# Patient Record
(demographics unavailable — no encounter records)

---

## 2024-11-12 NOTE — ADDENDUM
[FreeTextEntry1] : I, JANELL VILLATORO, acted solely as a scribe for Dr. Asher Watt on this date 11/12/2024.  All medical record entries made by the Scribe were at my, Dr. Asher Watt, direction and personally dictated by me on 11/12/2024. I have reviewed the chart and agree that the record accurately reflects my personal performance of the history, physical exam, assessment and plan. I have also personally directed, reviewed, and agreed with the chart.

## 2024-11-12 NOTE — DISCUSSION/SUMMARY
[de-identified] : I went over the pathophysiology of the patient's symptoms in great detail with the patient. I discussed the underlying pathophysiology of the patient's condition in great detail with the patient. I went over the patient's x-rays with them in great detail. At this time, she will start a course of physical therapy for strengthening and flexibility. A prescription was provided. We are prescribing Diclofenac 50mg twice a day. Instructions were discussed with the patient. A prescription was provided.   All of their questions were answered. They understand and consent to the plan.   FU in one month.

## 2024-11-12 NOTE — PHYSICAL EXAM
[de-identified] : Constitutional o Appearance : well-nourished, well developed, alert, in no acute distress  Head and Face o Head :  Inspection : atraumatic, normocephalic o Face :  Inspection : no visible rash or discoloration Respiratory o Respiratory Effort: breathing unlabored  Neurologic o Sensation : Normal sensation  Psychiatric o Mood and Affect: mood normal, affect appropriate  Lymphatic o Additional Nodes : No palpable lymph nodes present   Cervical Spine o Inspection/Palpation :  Inspection : alignment midline, normal degree of lordosis present  Skin : normal appearance, no masses or tenderness, trachea midline  Palpation : right paracervical tenderness o Range of Motion : limited rotation left and right o Tests: Negative Spurlings test  Right Upper Extremity o Right Shoulder :  Inspection/Palpation : anterior capsular  tenderness and AC joint tenderness, no swelling or deformities  Range of Motion : forward flexion to 110, full ER and limited IR with pain at the extremes of motion, abduction to 80 degrees, no crepitance  Strength : forward elevation 4-/5, IR 5/5, ER 5/5, ER at 90 of abduction 4-/5, supraspinatus 4-/5, adduction 5/5, abduction 4-/5, biceps/triceps 5/5,  5/5  Stability : no joint instability on provocative testing   Tests: Mon negative, Neer negative, Vanessa negative, drop arm test negative, [place]'s test positive  Left Upper Extremity o Left Shoulder :  Inspection/Palpation : no tenderness, no swelling or deformities  Range of Motion : full and painless in all planes, no crepitance  Strength : forward elevation 5/5, IR 5/5, ER 5/5, ER at 90 of abduction 5/5, supraspinatus 5/5, adduction 5/5, abduction 5/5, biceps/triceps 5/5,  5/5  Stability : no joint instability on provocative testing   Tests: Mon negative, Neer negative, Vanessa negative, drop arm test negative, [place]'s test negative  Gait and Station: Gait: gait normal, no significant extremity swelling or lymphedema, good proprioception and balance  Radiology Results 11/12/2024 o Right Shoulder: AP internal/external rotation and outlet views were obtained and revealed sclerosis of the greater tuberosity with moderate degenerative arthritis of the glenohumeral joint and AC joint

## 2025-03-11 NOTE — HISTORY OF PRESENT ILLNESS
[de-identified] : 54 year old RHD male, presents for a follow-up evaluation of right shoulder pain x 1 year. Patient states that she has intermittent right shoulder pain, radiating down to her right hand, described as "sharp, shooting". Patient states that pain was likely exacerbated by her job as an [employer] , pulling >50lb loads, but denies any recent falls, trauma, or other injuries. Patient now endorses difficulty getting dressed due to worsening right shoulder pain with movement. Patient admits to taking Diclofenac and she adds Tylenol, which provides some pain relief. Patient is not participating in PT at this time. Patient denies any associated numbness, tingling, or weakness to RUE.  She has not gone to physical therapy as we spoke about in November.  Radiology Results 11/12/2024 o Right Shoulder: AP internal/external rotation and outlet views were obtained and revealed sclerosis of the greater tuberosity with moderate degenerative arthritis of the glenohumeral joint and AC joint

## 2025-03-11 NOTE — PHYSICAL EXAM
[de-identified] : Constitutional o Appearance : well-nourished, well developed, alert, in no acute distress  Head and Face o Head :  Inspection : atraumatic, normocephalic o Face :  Inspection : no visible rash or discoloration Respiratory o Respiratory Effort: breathing unlabored  Neurologic o Sensation : Normal sensation  Psychiatric o Mood and Affect: mood normal, affect appropriate  Lymphatic o Additional Nodes : No palpable lymph nodes present   Cervical Spine o Inspection/Palpation :  Inspection : alignment midline, normal degree of lordosis present  Skin : normal appearance, no masses or tenderness, trachea midline  Palpation : right paracervical tenderness o Range of Motion : limited rotation left and right o Tests: Negative Spurlings test  Right Upper Extremity o Right Shoulder :  Inspection/Palpation : anterior capsular  tenderness and AC joint tenderness, no swelling or deformities  Range of Motion : forward flexion to 90 abduction to 80 degrees, limited IR L2,  no crepitance  Strength : forward elevation 4-/5, IR 5/5, ER 5/5, ER at 90 of abduction 4-/5, supraspinatus 4-/5, adduction 5/5, abduction 4-/5, biceps/triceps 5/5,  5/5  Stability : no joint instability on provocative testing   Tests: Mon negative, Neer negative, Vanessa negative, drop arm test negative, [place]'s test positive  Left Upper Extremity o Left Shoulder :  Inspection/Palpation : no tenderness, no swelling or deformities  Range of Motion : full and painless in all planes, no crepitance  Strength : forward elevation 5/5, IR 5/5, ER 5/5, ER at 90 of abduction 5/5, supraspinatus 5/5, adduction 5/5, abduction 5/5, biceps/triceps 5/5,  5/5  Stability : no joint instability on provocative testing   Tests: Mon negative, Neer negative, Vanessa negative, drop arm test negative, [place]'s test negative  Gait and Station: Gait: gait normal, no significant extremity swelling or lymphedema, good proprioception and balance

## 2025-03-11 NOTE — DISCUSSION/SUMMARY
[de-identified] : I went over the pathophysiology of the patient's symptoms in great detail with the patient. The patient was instructed in ROM exercises they are to do at home. We discussed the use of ice, Tylenol and anti-inflammatories to relieve pain. At this time, she will start a course of physical therapy for strengthening and flexibility. A prescription was provided. We have prescribed Diclofenac. A prescription was provided.  We discussed the use of Salon Pas lidocaine patches gel at this time. Instructions were discussed with the patient.  We have told the patient if she does not do the therapy the motion will be decreased and she will have more pain.  This will necessitate surgery which she certainly does not want.  All of their questions were answered. They understand and consent to the plan.   FU in 6 weeks.